# Patient Record
Sex: FEMALE | Race: BLACK OR AFRICAN AMERICAN | NOT HISPANIC OR LATINO | Employment: STUDENT | URBAN - METROPOLITAN AREA
[De-identification: names, ages, dates, MRNs, and addresses within clinical notes are randomized per-mention and may not be internally consistent; named-entity substitution may affect disease eponyms.]

---
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
## 2024-04-04 ENCOUNTER — CLINICAL SUPPORT (OUTPATIENT)
Dept: OBSTETRICS AND GYNECOLOGY | Facility: CLINIC | Age: 19
End: 2024-04-04
Payer: COMMERCIAL

## 2024-04-04 VITALS
HEIGHT: 66 IN | DIASTOLIC BLOOD PRESSURE: 65 MMHG | WEIGHT: 155.4 LBS | SYSTOLIC BLOOD PRESSURE: 111 MMHG | BODY MASS INDEX: 24.98 KG/M2

## 2024-04-04 DIAGNOSIS — Z30.42 ENCOUNTER FOR MANAGEMENT AND INJECTION OF DEPO-PROVERA: Primary | ICD-10-CM

## 2024-04-04 RX ORDER — MEDROXYPROGESTERONE ACETATE 150 MG/ML
150 INJECTION, SUSPENSION INTRAMUSCULAR ONCE
Status: COMPLETED | OUTPATIENT
Start: 2024-04-04 | End: 2024-04-04

## 2024-04-04 RX ADMIN — MEDROXYPROGESTERONE ACETATE 150 MG: 150 INJECTION, SUSPENSION INTRAMUSCULAR at 09:15

## 2024-04-04 NOTE — PROGRESS NOTES
Pt is here today for depo provera 150mg IM right deltoid, pt supplied , tolerated well , no reaction       NDC 84884-627-80  LOT [id number]  EXP 03/2026

## 2024-06-27 ENCOUNTER — TELEPHONE (OUTPATIENT)
Dept: OBSTETRICS AND GYNECOLOGY | Facility: CLINIC | Age: 19
End: 2024-06-27
Payer: COMMERCIAL

## 2024-06-27 DIAGNOSIS — Z30.42 DEPO-PROVERA CONTRACEPTIVE STATUS: Primary | ICD-10-CM

## 2024-06-27 RX ORDER — MEDROXYPROGESTERONE ACETATE 150 MG/ML
150 INJECTION, SUSPENSION INTRAMUSCULAR
Qty: 1 ML | Refills: 2 | Status: SHIPPED | OUTPATIENT
Start: 2024-06-27

## 2024-06-27 NOTE — TELEPHONE ENCOUNTER
Caller: Khadijah House    Relationship: Self    Best call back number: [phone] IF NEEDED CALL ANYTIME. IT IS OKAY TO LVM.    Requested Prescriptions:   Requested Prescriptions      No prescriptions requested or ordered in this encounter      DEPO     Pharmacy where request should be sent: [facility] [id number] [place], [place] - [address] AT [facility]) - [phone] [facility] [phone]      Last office visit with prescribing clinician: 11/2/2023   Last telemedicine visit with prescribing clinician: Visit date not found   Next office visit with prescribing clinician: Visit date not found     Additional details provided by patient: PATIENT IS SCHEDULED FOR DEPO INJECTION 06/28/24 AT 9:30 AM. PHARMACY NEEDS NEW PRESCRIPTION SENT MY END OF DAY TO BE ABLE TO FILL BY TOMORROW MORNING. UNABLE TO WARM TRANSFER.    Does the patient have less than a 3 day supply:  [x] Yes  [] No    Would you like a call back once the refill request has been completed: [] Yes [x] No    If the office needs to give you a call back, can they leave a voicemail: [x] Yes [] No    Mahad Lo   06/27/24 10:09 EDT

## 2024-06-27 NOTE — TELEPHONE ENCOUNTER
Spoke with Khadijah to let her know that Dr Fraser sent in Rx for Depo to [facility] so she can get her shot tomorrow. Thank you

## 2024-06-28 ENCOUNTER — CLINICAL SUPPORT (OUTPATIENT)
Dept: OBSTETRICS AND GYNECOLOGY | Facility: CLINIC | Age: 19
End: 2024-06-28
Payer: COMMERCIAL

## 2024-06-28 VITALS — WEIGHT: 141 LBS | BODY MASS INDEX: 22.77 KG/M2

## 2024-06-28 DIAGNOSIS — Z30.42 ENCOUNTER FOR MANAGEMENT AND INJECTION OF DEPO-PROVERA: Primary | ICD-10-CM

## 2024-06-28 PROCEDURE — 96372 THER/PROPH/DIAG INJ SC/IM: CPT | Performed by: STUDENT IN AN ORGANIZED HEALTH CARE EDUCATION/TRAINING PROGRAM

## 2024-06-28 RX ORDER — MEDROXYPROGESTERONE ACETATE 150 MG/ML
150 INJECTION, SUSPENSION INTRAMUSCULAR ONCE
Status: COMPLETED | OUTPATIENT
Start: 2024-06-28 | End: 2024-06-28

## 2024-06-28 RX ADMIN — MEDROXYPROGESTERONE ACETATE 150 MG: 150 INJECTION, SUSPENSION INTRAMUSCULAR at 09:05

## 2024-06-28 NOTE — PROGRESS NOTES
Pt here for pt supplied depo injection, tolerated without a reaction. Rt Deltoid  NDC:90099-314-35  LOT:[id number]  EXP:09/2025

## 2024-09-25 ENCOUNTER — CLINICAL SUPPORT (OUTPATIENT)
Dept: OBSTETRICS AND GYNECOLOGY | Facility: CLINIC | Age: 19
End: 2024-09-25
Payer: COMMERCIAL

## 2024-09-25 VITALS — BODY MASS INDEX: 23.9 KG/M2 | WEIGHT: 148 LBS

## 2024-09-25 DIAGNOSIS — Z30.42 ENCOUNTER FOR MANAGEMENT AND INJECTION OF DEPO-PROVERA: Primary | ICD-10-CM

## 2024-09-25 PROCEDURE — 96372 THER/PROPH/DIAG INJ SC/IM: CPT | Performed by: STUDENT IN AN ORGANIZED HEALTH CARE EDUCATION/TRAINING PROGRAM

## 2024-09-25 RX ORDER — MEDROXYPROGESTERONE ACETATE 150 MG/ML
150 INJECTION, SUSPENSION INTRAMUSCULAR ONCE
Status: COMPLETED | OUTPATIENT
Start: 2024-09-25 | End: 2024-09-25

## 2024-09-25 RX ADMIN — MEDROXYPROGESTERONE ACETATE 150 MG: 150 INJECTION, SUSPENSION INTRAMUSCULAR at 09:10

## 2024-10-24 ENCOUNTER — OFFICE VISIT (OUTPATIENT)
Dept: OBSTETRICS AND GYNECOLOGY | Facility: CLINIC | Age: 19
End: 2024-10-24
Payer: COMMERCIAL

## 2024-10-24 VITALS
SYSTOLIC BLOOD PRESSURE: 111 MMHG | DIASTOLIC BLOOD PRESSURE: 74 MMHG | WEIGHT: 149.6 LBS | BODY MASS INDEX: 24.04 KG/M2 | HEART RATE: 75 BPM | HEIGHT: 66 IN

## 2024-10-24 DIAGNOSIS — N94.2 VAGINISMUS: ICD-10-CM

## 2024-10-24 DIAGNOSIS — Z30.09 BIRTH CONTROL COUNSELING: Primary | ICD-10-CM

## 2024-10-24 NOTE — PROGRESS NOTES
"Chief Complaint   Patient presents with    Gynecologic Exam     Pt is here today to discuss other options for birthcontrol. Currently doing depo injections.        SUBJECTIVE:     Khadijah House is a 18 y.o.  who presents to discuss birth control options. The patient is currently receiving depo provera injections, last dose on 24. She reports doing well on the depo provera and not having any periods. She likes this about it but she forgets to come in and then has to reschedule the appointment for depo administration. Her periods prior to depo provera were very painful. She would like to know other options that are available to her for contraception.    She is also concerned regarding initial penetration being painful with intercourse and then hurting after intercourse. She states she is in a relationship with one male partner and it was not like this in the beginning. It now happens almost every time and she does not want to have intercourse.      History reviewed. No pertinent past medical history.   History reviewed. No pertinent surgical history.   Social History     Tobacco Use    Smoking status: Every Day   Vaping Use    Vaping status: Never Used   Substance Use Topics    Alcohol use: Never    Drug use: Yes     Types: Marijuana     OB History    Para Term  AB Living   0 0 0 0 0 0   SAB IAB Ectopic Molar Multiple Live Births   0 0 0 0 0 0        Review of Systems   Genitourinary:  Positive for dyspareunia.       OBJECTIVE:   Vitals:    10/24/24 0905   BP: 111/74   Pulse: 75   Weight: 67.9 kg (149 lb 9.6 oz)   Height: 167.6 cm (65.98\")        Physical Exam  Vitals reviewed.   Constitutional:       General: She is not in acute distress.  HENT:      Head: Normocephalic and atraumatic.      Right Ear: External ear normal.      Left Ear: External ear normal.   Eyes:      Extraocular Movements: Extraocular movements intact.      Pupils: Pupils are equal, round, and reactive to light. "   Pulmonary:      Effort: Pulmonary effort is normal. No respiratory distress.   Musculoskeletal:         General: No deformity. Normal range of motion.      Cervical back: Normal range of motion and neck supple.   Skin:     General: Skin is warm and dry.   Neurological:      General: No focal deficit present.      Mental Status: She is alert and oriented to person, place, and time.   Psychiatric:         Mood and Affect: Mood normal.         Behavior: Behavior normal.         ASSESSMENT:     ICD-10-CM ICD-9-CM   1. Birth control counseling  Z30.09 V25.09   2. Vaginismus  N94.2 625.1       PLAN:   Patient was counseled on contraceptive options, including oral contraceptive pills, vaginal rings, patches, Depo Provera, long acting reversible contraceptive options (Nexplanon, Mirena, Paragard, Kyleena), barrier methods (such as condoms).  Patient is interested in Nexplanon.  She was counseled on this methods efficacy, how to initiate this method, use of back up contraception. She was counseled on the risk of transmission of STDs and expected changes in the menstrual cycle including amenorrhea, regular periods, irregular bleeding patterns. Also there is possibility of weight changes, mood changes, skin changes. She was counseled on the risks involved with this medication including but not limited to bleeding, infection, damage to surrounding structures, migraines of device that would require additional surgery, increased risk of VTE disease.  To start the contraception we discussed it is necessary that we are reasonably certain that she is not pregnant. We will plan to insert before her next depo provera injection on 12/18/24. She agreed to abide by these conditions.    She was recommended to follow up soon if there are any side effects or problems.    We briefly discussed her symptoms of vaginismus that have developed since her relationship with her boyfriend. I recommended starting with pelvic floor PT and then if not  improving, consideration for vaginal valium. All questions answered.  Follow up for Nexplanon insertion once device ordered through her insurance.   Jennifer Fraser MD

## 2024-10-25 ENCOUNTER — TELEPHONE (OUTPATIENT)
Dept: OBSTETRICS AND GYNECOLOGY | Facility: CLINIC | Age: 19
End: 2024-10-25
Payer: COMMERCIAL

## 2024-10-25 NOTE — TELEPHONE ENCOUNTER
No answer/busy left vm to call back    Pt needs to unload or bring in insurance card to order nexplanon

## 2024-11-01 ENCOUNTER — TELEPHONE (OUTPATIENT)
Dept: OBSTETRICS AND GYNECOLOGY | Facility: CLINIC | Age: 19
End: 2024-11-01
Payer: COMMERCIAL

## 2024-11-06 ENCOUNTER — HOSPITAL ENCOUNTER (OUTPATIENT)
Dept: PHYSICAL THERAPY | Facility: HOSPITAL | Age: 19
Setting detail: THERAPIES SERIES
Discharge: HOME | End: 2024-11-06
Payer: COMMERCIAL

## 2024-11-06 DIAGNOSIS — M62.89 PELVIC FLOOR DYSFUNCTION: ICD-10-CM

## 2024-11-06 DIAGNOSIS — M62.838 MUSCLE SPASM: ICD-10-CM

## 2024-11-06 DIAGNOSIS — R10.2 PELVIC PAIN: Primary | ICD-10-CM

## 2024-11-06 PROCEDURE — 97161 PT EVAL LOW COMPLEX 20 MIN: CPT

## 2024-11-06 NOTE — THERAPY EVALUATION
Outpatient Physical Therapy Ortho Initial Evaluation  [facility]     Patient Name: Khadijah House  : 2005  MRN: [id number]  Today's Date: 2024      Visit Date: 2024    There is no problem list on file for this patient.       No past medical history on file.     No past surgical history on file.    Visit Dx:     ICD-10-CM ICD-9-CM   1. Pelvic pain  R10.2 [id number]   2. Pelvic floor dysfunction  M62.89 618.83   3. Muscle spasm  M62.838 728.85          Patient History       Row Name 24 1000             Fall Risk Assessment    Any falls in the past year: Yes  -RS         Services    Are you currently receiving Home Health services No  -RS         Daily Activities    Primary Language English  -RS      Are you able to read Yes  -RS      Are you able to write Yes  -RS      How does patient learn best? Listening;Reading;Demonstration  -RS      Pt Participated in POC and Goals Yes  -RS         Safety    Are you being hurt, hit, or frightened by anyone at home or in your life? No  -RS      Are you being neglected by a caregiver No  -RS                User Key  (r) = Recorded By, (t) = Taken By, (c) = Cosigned By      Initials Name Provider Type    RS Leanne Nice PT Physical Therapist                                 Pelvic Health       Row Name 24 1000             Subjective    Patient Reason for Visit Pelvic Pain  -RS      Brief Description of Chief Complaint The pt is a 17 yo female who presents with pelvic pain present about 10 months, when she first began having intercourse with her partner, she did not have pain but since  of this year she has been having pain. 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 She denies UUI/MARTÍNEZ or constipation.  -RS      Patient Participated in POC and Goals Yes  -RS         Fall Risk Assessment    Any falls in the past year: Yes  -RS         Services    Are you currently receiving Home Health services No  -RS          Daily Activities    Primary Language English  -RS      Are you able to read Yes  -RS      Are you able to write Yes  -RS      How does patient learn best? Listening;Reading;Demonstration  -RS      Pt Participated in POC and Goals Yes  -RS         Safety    Are you being hurt, hit, or frightened by anyone at home or in your life? No  -RS      Are you being neglected by a caregiver No  -RS         Urinary/Bowel History    Stress incontinence no  -RS      Post void dribble no  -RS      Urgency no  -RS         Pregnancy/Sexual History    Number of Pregnancies 0  -RS      Pain with initial penetration Yes  -RS      Pain with deep penetration Yes  -RS         Pelvic Floor Muscle    Patient/Parent/Guardian Consented to Internal Pelvic Floor Exam Yes  -RS      Strength (Right) 3: Squeeze with/without lift  -RS      Strength (Left) 3: Squeeze with/without lift  -RS      Symmetry of Sustained Maximal Contraction Symmetrical  -RS      Endurance (Ability to Hold Maximal Contraction) 5 sec  -RS      1st Layer Tone/Internal Palpation burning reported at introitus  -RS      2nd Layer Tone/Internal Palpation inc tone L>R  -RS      3rd Layer Tone/Internal Palpation inc tone and pain throughout, specifically OI  -RS      Internal Pelvic Floor Comments difficulty returning to resting position, elevated at baseline  -RS         Observations    Perineal Observation Performed? Yes  -RS         Observation of Contraction in Perineum    Anal [place] Present  -RS      Perineal Body Lift Present  -RS         Pelvic Floor    Ability to Isolate Contraction of Pelvic Floor Yes  -RS         Cough    Abdominal Contraction Yes  -RS      Leak None  -RS      Contraction of Pelvic Floor No  -RS      Bulge No  -RS      No Response Yes  -RS         Outcome Measures    Outcome Measure Options Female NIH-CPSI  -RS         Female NIH-CPSI    Pain Total 9  -RS      Urinary Symptoms Total 0  -RS      Quality of Life Impact Total 4  -RS      Total Score 13  -RS                 User Key  (r) = Recorded By, (t) = Taken By, (c) = Cosigned By      Initials Name Provider Type    RS Leanne Nice PT Physical Therapist                    Therapy Education  Education Details: Role of  PF PT, POC, differential diagnosis, initial HEP, expectations, goals, anatomy, role of PF. Access Code [email or web address]  Given: HEP  Program: New  How Provided: Verbal, Demonstration  Provided to: Patient  Level of Understanding: Verbalized, Demonstrated      PT OP Goals       Row Name 11/06/24 1200          PT Short Term Goals    STG Date to Achieve 12/21/24  -RS     STG 1 The pt will demonstrate ability to lengthen the pelvic floor with diaphragmatic breathing to indicate improved motor control and coordination.  -RS     STG 1 Progress New  -RS     STG 2 The pt will report understanding of initiation of dilator training to facilitate improved tolerance for penetration.  -RS     STG 2 Progress New  -RS        Long Term Goals    LTG Date to Achieve 02/04/25  -RS     LTG 1 The pt will report no more than 2/10 pain with intercourse to facilitate improved iADL performance.  -RS     LTG 1 Progress New  -RS     LTG 2 The pt will tolerate pelvic exam with speculum without significant increase in pain to facilitate improved IND with health maintenance.  -RS     LTG 2 Progress New  -RS     LTG 3 The pt will demonstrate appropriate bending/lifting mechanics to facilitate improved work performance.  -RS     LTG 3 Progress New  -RS        Time Calculation    PT Goal Re-Cert Due Date 02/04/25  -RS               User Key  (r) = Recorded By, (t) = Taken By, (c) = Cosigned By      Initials Name Provider Type    RS Leanne Nice PT Physical Therapist                     PT Assessment/Plan       Row Name 11/06/24 1200          PT Assessment    Functional Limitations Limitation in home management;Limitations in community activities;Performance in self-care ADL;Performance in work activities  -RS     Impairments  Impaired muscle length;Impaired muscle power;Impaired muscle endurance;Range of motion;Posture;Peripheral nerve integrity;Pain;Muscle strength  -RS     Assessment Comments Khadijah House is a 18 y.o. female referred to physical therapy for vaginismus. She presents with a stable clinical presentation, along with no remarkable comorbidities and personal factors of chronicity of pain that may impact her progress in the plan of care. Pt presents today with increased tone in pelvic floor muscles, layers 1-3 with burning at introitus and TTP B OI, difficulty coordinating PFM with breathing/lengthening . her signs and symptoms are consistent with referring diagnosis. The previous impairments limit her ability to tolerate pelvic exam or intercourse without pain. The pt self scores 13 on the femal NIH-CPSI. Pt will benefit from skilled PT to address the previous impairments and return to PLOF.  -RS     Please refer to paper survey for additional self-reported information No  -RS     Rehab Potential Good  -RS     Patient/caregiver participated in establishment of treatment plan and goals Yes  -RS     Patient would benefit from skilled therapy intervention Yes  -RS        PT Plan    PT Frequency 1x/week  -RS     Predicted Duration of Therapy Intervention (PT) 8-10 sessions  -RS     Planned CPT's? PT RE-EVAL: 62761;PT THER PROC EA 15 MIN: 60976;PT THER ACT EA 15 MIN: 73836;PT MANUAL THERAPY EA 15 MIN: 24000;PT NEUROMUSC RE-EDUCATION EA 15 MIN: 30242;PT GAIT TRAINING EA 15 MIN: 83895;PT SELF CARE/HOME MGMT/TRAIN EA 15: 51243;PT HOT OR COLD PACK TREAT MCARE;PT ELECTRICAL STIM UNATTEND: ;PT TRACTION LUMBAR: 32139;PT EVAL LOW COMPLEXITY: 62797  -RS     PT Plan Comments hip strength assessment, hip mobility, amarjit breathing, internal manual PRN, dilator education review, perineal massage  -RS               User Key  (r) = Recorded By, (t) = Taken By, (c) = Cosigned By      Initials Name Provider Type    RS Leanne Nice, PT  Physical Therapist                       OP Exercises       Row Name 11/06/24 1200 11/06/24 1000          Subjective    Patient Reason for Visit -- Pelvic Pain  -RS     Brief Description of Chief Complaint -- The pt is a 19 yo female who presents with pelvic pain present about 10 months, when she first began having intercourse with her partner, she did not have pain but since Jan of this year she has been having pain. 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 She denies UUI/MARTÍNEZ or constipation.  -RS     Patient Participated in POC and Goals -- Yes  -RS        Exercise 1    Exercise Name 1 dilator intro  -RS --        Exercise 2    Exercise Name 2 POC discussion  -RS --               User Key  (r) = Recorded By, (t) = Taken By, (c) = Cosigned By      Initials Name Provider Type    RS Leanne Nice PT Physical Therapist                                            Time Calculation:     Start Time: 1002  Stop Time: 1045  Time Calculation (min): 43 min  Untimed Charges  PT Eval/Re-eval Minutes: 40  Total Minutes  Untimed Charges Total Minutes: 40   Total Minutes: 40     Therapy Charges for Today       Code Description Service Date Service Provider Modifiers Qty    [id number]  PT EVAL LOW COMPLEXITY 3 11/6/2024 Leanne Nice, PT GP 1                      Leanne Nice PT  11/6/2024

## 2024-11-22 ENCOUNTER — TELEPHONE (OUTPATIENT)
Dept: OBSTETRICS AND GYNECOLOGY | Facility: CLINIC | Age: 19
End: 2024-11-22
Payer: COMMERCIAL

## 2024-11-22 NOTE — TELEPHONE ENCOUNTER
I asked her to bring her insurance to [facility] for the reason being , I have to order her Nexplanon with the Nexplanon company and they require a copy. since [place] pharmacy did not approve it. I will try to contact [facility] for buy and bill but its not guaranteed . Thanks !

## 2024-11-22 NOTE — TELEPHONE ENCOUNTER
Spoke with pt regarding [employer] insurance denying nexlplanon. Advised pt to upload or bring new insurance.   Pt states she is going to our [place] office sometime next week.

## 2024-11-22 NOTE — TELEPHONE ENCOUNTER
Pt told the hub she has [employer] insurance now, if you could please use that for the nexlplanon.       Thank you

## 2024-12-05 ENCOUNTER — HOSPITAL ENCOUNTER (OUTPATIENT)
Dept: PHYSICAL THERAPY | Facility: HOSPITAL | Age: 19
Setting detail: THERAPIES SERIES
Discharge: HOME | End: 2024-12-05
Payer: COMMERCIAL

## 2024-12-05 DIAGNOSIS — R10.2 PELVIC PAIN: Primary | ICD-10-CM

## 2024-12-05 DIAGNOSIS — M62.838 MUSCLE SPASM: ICD-10-CM

## 2024-12-05 DIAGNOSIS — M62.89 PELVIC FLOOR DYSFUNCTION: ICD-10-CM

## 2024-12-05 PROCEDURE — 97530 THERAPEUTIC ACTIVITIES: CPT

## 2024-12-05 PROCEDURE — 97110 THERAPEUTIC EXERCISES: CPT

## 2024-12-05 NOTE — THERAPY PROGRESS REPORT/RE-CERT
Outpatient Physical Therapy Ortho Progress Note  [facility]     Patient Name: Khadijah House  : 2005  MRN: [id number]  Today's Date: 2024      Visit Date: 2024    Visit Dx:    ICD-10-CM ICD-9-CM   1. Pelvic pain  R10.2 [id number]   2. Pelvic floor dysfunction  M62.89 618.83   3. Muscle spasm  M62.838 728.85       There is no problem list on file for this patient.       No past medical history on file.     No past surgical history on file.     PT Ortho       Row Name 24       General ROM    GENERAL ROM COMMENTS B hip ER AROM WNL, B hip IR 50% WNL  -RS       MMT (Manual Muscle Testing)    Rt Lower Ext Rt Hip Extension;Rt Hip Internal (Medial) Rotation;Rt Hip External (Lateral) Rotation;Rt Hip ABduction  -RS    Lt Lower Ext Lt Hip Extension;Lt Hip ABduction;Lt Hip External (Lateral) Rotation;Lt Hip Internal (Medial) Rotation  -RS       MMT Right Lower Ext    Rt Hip Extension MMT, Gross Movement (4/5) good  -RS    Rt Hip ABduction MMT, Gross Movement (4+/5) good plus  -RS    Rt Hip Internal (Medial) Rotation MMT, Gross Movement (4-/5) good minus  -RS    Rt Hip External (Lateral) Rotation MMT, Gross Movement (4+/5) good plus  -RS       MMT Left Lower Ext    Lt Hip Extension MMT, Gross Movement (4/5) good  -RS    Lt Hip ABduction MMT, Gross Movement (4+/5) good plus  -RS    Lt Hip Internal (Medial) Rotation MMT, Gross Movement (4-/5) good minus  -RS    Lt Hip External (Lateral) Rotation MMT, Gross Movement (4/5) good  -RS              User Key  (r) = Recorded By, (t) = Taken By, (c) = Cosigned By      Initials Name Provider Type    RS Leanne Nice PT Physical Therapist                                 PT Assessment/Plan       Row Name 24 09          PT Assessment    Functional Limitations Limitation in home management;Limitations in community activities;Performance in self-care ADL;Performance in work activities  -RS     Impairments Impaired muscle length;Impaired muscle  power;Impaired muscle endurance;Range of motion;Posture;Peripheral nerve integrity;Pain;Muscle strength  -RS     Assessment Comments Pt returns for first follow up session after initial eval reporting no significant changes. Assessed hip mobility and strength and continued diaphragmatic breathing and hip mobility in multiple positions. She has not met any short or long term goals as expected as this is her first follow up session. Encouraged pt to acquire dilators and discussed initiation, pt receptive.  -RS        PT Plan    PT Plan Comments perineal massage, hold for 2-3 weeks while initiating dilators, manual PRN  -RS               User Key  (r) = Recorded By, (t) = Taken By, (c) = Cosigned By      Initials Name Provider Type    RS Leanne Nice, PT Physical Therapist                       OP Exercises       Row Name 12/05/24 0800             Subjective    Subjective Comments pt reports no significant change since last session  -RS         Subjective Pain    Able to rate subjective pain? yes  -RS      Pre-Treatment Pain Level 0  -RS      Post-Treatment Pain Level 0  -RS         Total Minutes    55044 - PT Therapeutic Exercise Minutes 30  -RS      42116 - PT Therapeutic Activity Minutes 8  -RS         Exercise 1    Exercise Name 1 HL hip IR  -RS      Cueing 1 Verbal;Demo  -RS      Reps 1 1 min ea  -RS         Exercise 2    Exercise Name 2 cat cow hip IR  -RS      Cueing 2 Verbal;Demo  -RS      Reps 2 15  -RS         Exercise 3    Exercise Name 3 piriformis stretch  -RS      Cueing 3 Verbal;Demo  -RS      Reps 3 3  -RS      Time 3 30s  -RS         Exercise 4    Exercise Name 4 ruddy pose  -RS      Cueing 4 Verbal;Demo  -RS      Reps 4 3 min  -RS         Exercise 5    Exercise Name 5 discussion of dilator training, use of lubricant  -RS         Exercise 6    Exercise Name 6 diaphragmatic breathing  -RS      Reps 6 5 min  -RS      Additional Comments education  -RS                User Key  (r) = Recorded By, (t)  = Taken By, (c) = Cosigned By      Initials Name Provider Type    RS Leanne Nice, PT Physical Therapist                                  PT OP Goals       Row Name 12/05/24 0800          PT Short Term Goals    STG Date to Achieve 12/21/24  -RS     STG 1 The pt will demonstrate ability to lengthen the pelvic floor with diaphragmatic breathing to indicate improved motor control and coordination.  -RS     STG 1 Progress Ongoing  -RS     STG 2 The pt will report understanding of initiation of dilator training to facilitate improved tolerance for penetration.  -RS     STG 2 Progress Ongoing  -RS        Long Term Goals    LTG Date to Achieve 02/04/25  -RS     LTG 1 The pt will report no more than 2/10 pain with intercourse to facilitate improved iADL performance.  -RS     LTG 1 Progress Ongoing  -RS     LTG 1 Progress Comments pain 5/10 recent time, in sidelying  -RS     LTG 2 The pt will tolerate pelvic exam with speculum without significant increase in pain to facilitate improved IND with health maintenance.  -RS     LTG 2 Progress Ongoing  -RS     LTG 3 The pt will demonstrate appropriate bending/lifting mechanics to facilitate improved work performance.  -RS     LTG 3 Progress Ongoing  -RS               User Key  (r) = Recorded By, (t) = Taken By, (c) = Cosigned By      Initials Name Provider Type    RS Leanne Nice, PT Physical Therapist                    Therapy Education  Education Details: Access Code [email or web address]  Given: HEP  Program: Reinforced, Progressed  How Provided: Verbal, Demonstration, Written  Provided to: Patient  Level of Understanding: Verbalized, Demonstrated              Time Calculation:   Start Time: 0833  Stop Time: 0911  Time Calculation (min): 38 min  Timed Charges  96343 - PT Therapeutic Exercise Minutes: 30  61020 - PT Therapeutic Activity Minutes: 8  Total Minutes  Timed Charges Total Minutes: 38   Total Minutes: 38  Therapy Charges for Today       Code Description Service Date  Service Provider Modifiers Qty    97293231552  PT THER PROC EA 15 MIN 12/5/2024 Leanne Nice, PT GP 2    [id number]  PT THERAPEUTIC ACT EA 15 MIN 12/5/2024 Leanne Nice, PT GP 1                      Leanne Nice, PT  12/5/2024

## 2024-12-12 ENCOUNTER — HOSPITAL ENCOUNTER (OUTPATIENT)
Dept: PHYSICAL THERAPY | Facility: HOSPITAL | Age: 19
Setting detail: THERAPIES SERIES
Discharge: HOME | End: 2024-12-12
Payer: COMMERCIAL

## 2024-12-12 DIAGNOSIS — M62.838 MUSCLE SPASM: ICD-10-CM

## 2024-12-12 DIAGNOSIS — M62.89 PELVIC FLOOR DYSFUNCTION: ICD-10-CM

## 2024-12-12 DIAGNOSIS — R10.2 PELVIC PAIN: Primary | ICD-10-CM

## 2024-12-12 PROCEDURE — 97110 THERAPEUTIC EXERCISES: CPT

## 2024-12-12 PROCEDURE — 97530 THERAPEUTIC ACTIVITIES: CPT

## 2024-12-12 NOTE — THERAPY TREATMENT NOTE
Outpatient Physical Therapy Ortho Treatment Note  [facility]     Patient Name: Khadijah House  : 2005  MRN: [id number]  Today's Date: 2024      Visit Date: 2024    Visit Dx:    ICD-10-CM ICD-9-CM   1. Pelvic pain  R10.2 [id number]   2. Pelvic floor dysfunction  M62.89 618.83   3. Muscle spasm  M62.838 728.85       There is no problem list on file for this patient.       No past medical history on file.     No past surgical history on file.                     PT Assessment/Plan       Row Name 24 0900          PT Assessment    Assessment Comments Pt reports overall good tolerance for last session with slightly decreased pain during intercourse bbut continuend ain following. Pt has not bought dilators yet therefore encourged to look into purchasing, pt receptive. Initiated hip stability activities including clamshells, bridge, and squats as pt reports hip muscle pain following end of wrk day, pt reports fatigue in expected muscle groups.  updated HEP and reviewed with pt who reports understanding and remains appropriate for PT.  -RS        PT Plan    PT Plan Comments perineal massage, dilatos, hold for 1 month after next session  -RS               User Key  (r) = Recorded By, (t) = Taken By, (c) = Cosigned By      Initials Name Provider Type    RS Leanne Nice, PT Physical Therapist                       OP Exercises       Row Name 24 0800             Subjective    Subjective Comments pt had intercourse x2 and it was not too painful during but after she had paina nd burning for a short time  -RS         Subjective Pain    Able to rate subjective pain? yes  -RS      Pre-Treatment Pain Level 0  -RS      Post-Treatment Pain Level 0  -RS         Total Minutes    57194 - PT Therapeutic Exercise Minutes 30  -RS      53707 - PT Therapeutic Activity Minutes 8  -RS         Exercise 1    Exercise Name 1 clamshell  -RS      Cueing 1 Verbal;Demo  -RS      Sets 1 2  -RS      Reps 1 10  -RS       Time 1 GTB  -RS         Exercise 2    Exercise Name 2 thoracic rotation  -RS      Cueing 2 Verbal;Demo  -RS      Reps 2 10ea  -RS         Exercise 3    Exercise Name 3 bridge with AB  -RS      Cueing 3 Verbal;Demo  -RS      Sets 3 2  -RS      Reps 3 10  -RS      Time 3 3-5s  -RS         Exercise 4    Exercise Name 4 squat tapping hips  -RS      Cueing 4 Verbal;Demo  -RS      Sets 4 2  -RS      Reps 4 10  -RS      Time 4 GTB  -RS         Exercise 5    Exercise Name 5 review of POC, dilator training (education)  -RS         Exercise 6    Exercise Name 6 use of lubricant/positions (education)  -RS      Reps 6 --  -RS                User Key  (r) = Recorded By, (t) = Taken By, (c) = Cosigned By      Initials Name Provider Type    Leanne Pablo, PT Physical Therapist                                  PT OP Goals       Row Name 12/12/24 0800          PT Short Term Goals    STG Date to Achieve 12/21/24  -RS     STG 1 The pt will demonstrate ability to lengthen the pelvic floor with diaphragmatic breathing to indicate improved motor control and coordination.  -RS     STG 1 Progress Ongoing  -RS     STG 2 The pt will report understanding of initiation of dilator training to facilitate improved tolerance for penetration.  -RS     STG 2 Progress Ongoing  -RS        Long Term Goals    LTG Date to Achieve 02/04/25  -RS     LTG 1 The pt will report no more than 2/10 pain with intercourse to facilitate improved iADL performance.  -RS     LTG 1 Progress Ongoing  -RS     LTG 2 The pt will tolerate pelvic exam with speculum without significant increase in pain to facilitate improved IND with health maintenance.  -RS     LTG 2 Progress Ongoing  -RS     LTG 3 The pt will demonstrate appropriate bending/lifting mechanics to facilitate improved work performance.  -RS     LTG 3 Progress Ongoing  -RS               User Key  (r) = Recorded By, (t) = Taken By, (c) = Cosigned By      Initials Name Provider Type    GLENN Nice  EMMANUEL Perdomo Physical Therapist                    Therapy Education  Given: HEP  Program: Reinforced, Progressed  How Provided: Verbal, Demonstration, Written  Provided to: Patient  Level of Understanding: Verbalized, Demonstrated              Time Calculation:   Start Time: 0836  Stop Time: 0915  Time Calculation (min): 39 min  Timed Charges  51445 - PT Therapeutic Exercise Minutes: 30  23119 - PT Therapeutic Activity Minutes: 8  Total Minutes  Timed Charges Total Minutes: 38   Total Minutes: 38                Leanne Nice PT  12/12/2024

## 2024-12-17 ENCOUNTER — TELEPHONE (OUTPATIENT)
Dept: OBSTETRICS AND GYNECOLOGY | Facility: CLINIC | Age: 19
End: 2024-12-17
Payer: COMMERCIAL

## 2024-12-17 NOTE — TELEPHONE ENCOUNTER
Pt Nexplanon covered 100%   Spoke with Maury Esteban [facility] cross   Reference# [id number]

## 2024-12-19 ENCOUNTER — HOSPITAL ENCOUNTER (OUTPATIENT)
Dept: PHYSICAL THERAPY | Facility: HOSPITAL | Age: 19
Setting detail: THERAPIES SERIES
Discharge: HOME | End: 2024-12-19
Payer: COMMERCIAL

## 2024-12-19 DIAGNOSIS — M62.838 MUSCLE SPASM: ICD-10-CM

## 2024-12-19 DIAGNOSIS — M62.89 PELVIC FLOOR DYSFUNCTION: ICD-10-CM

## 2024-12-19 DIAGNOSIS — R10.2 PELVIC PAIN: Primary | ICD-10-CM

## 2024-12-19 PROCEDURE — 97530 THERAPEUTIC ACTIVITIES: CPT

## 2024-12-19 PROCEDURE — 97110 THERAPEUTIC EXERCISES: CPT

## 2024-12-19 NOTE — THERAPY TREATMENT NOTE
Outpatient Physical Therapy Ortho Treatment Note  [facility]     Patient Name: Khadijah House  : 2005  MRN: [id number]  Today's Date: 2024      Visit Date: 2024    Visit Dx:    ICD-10-CM ICD-9-CM   1. Pelvic pain  R10.2 [id number]   2. Pelvic floor dysfunction  M62.89 618.83   3. Muscle spasm  M62.838 728.85       There is no problem list on file for this patient.       No past medical history on file.     No past surgical history on file.                     PT Assessment/Plan       Row Name 24 0900          PT Assessment    Assessment Comments Pt reports good tolerance for last session and slightly decreased pain with penetration when in sidelying however continues to have pain at the entrance. Educaction was provided regarding perineal massage and dilator training as pt's dilators are arriving next week. pt reports understanding of education and updated HEP to include side steps.  -RS        PT Plan    PT Plan Comments Hold for 4-5 weeks, follow up regarding dilator training  -RS               User Key  (r) = Recorded By, (t) = Taken By, (c) = Cosigned By      Initials Name Provider Type    RS Leanne Nice, PT Physical Therapist                       OP Exercises       Row Name 24 0900             Subjective    Subjective Comments pt reports intercourse hurt more at the entrance, her dilators come in Monday  -RS         Subjective Pain    Able to rate subjective pain? yes  -RS      Pre-Treatment Pain Level 0  -RS      Post-Treatment Pain Level 0  -RS         Total Minutes    01495 - PT Therapeutic Exercise Minutes 25  -RS      04804 - PT Therapeutic Activity Minutes 14  -RS         Exercise 1    Exercise Name 1 clamshell  -RS      Cueing 1 Verbal;Demo  -RS      Sets 1 2  -RS      Reps 1 10  -RS      Time 1 GTB  -RS         Exercise 2    Exercise Name 2 thoracic rotation  -RS      Cueing 2 Verbal;Demo  -RS      Reps 2 10ea  -RS         Exercise 3    Exercise Name 3 bridge  with AB  -RS      Cueing 3 Verbal;Demo  -RS      Sets 3 2  -RS      Reps 3 10  -RS      Time 3 3-5s  -RS         Exercise 4    Exercise Name 4 squat tapping hips  -RS      Cueing 4 Verbal;Demo  -RS      Sets 4 2  -RS      Reps 4 10  -RS      Time 4 GTB  -RS         Exercise 5    Exercise Name 5 education- perineal massage  -RS         Exercise 6    Exercise Name 6 education- dilator initiation and progression  -RS         Exercise 7    Exercise Name 7 side steps  -RS      Cueing 7 Verbal  -RS      Reps 7 3 laps  -RS      Time 7 GTB  -RS                User Key  (r) = Recorded By, (t) = Taken By, (c) = Cosigned By      Initials Name Provider Type    RS Leanne Nice PT Physical Therapist                                  PT OP Goals       Row Name 12/19/24 0800          PT Short Term Goals    STG Date to Achieve 12/21/24  -RS     STG 1 The pt will demonstrate ability to lengthen the pelvic floor with diaphragmatic breathing to indicate improved motor control and coordination.  -RS     STG 1 Progress Met  -RS     STG 2 The pt will report understanding of initiation of dilator training to facilitate improved tolerance for penetration.  -RS     STG 2 Progress Met  -RS        Long Term Goals    LTG Date to Achieve 02/04/25  -RS     LTG 1 The pt will report no more than 2/10 pain with intercourse to facilitate improved iADL performance.  -RS     LTG 1 Progress Ongoing  -RS     LTG 2 The pt will tolerate pelvic exam with speculum without significant increase in pain to facilitate improved IND with health maintenance.  -RS     LTG 2 Progress Ongoing  -RS     LTG 3 The pt will demonstrate appropriate bending/lifting mechanics to facilitate improved work performance.  -RS     LTG 3 Progress Ongoing  -RS               User Key  (r) = Recorded By, (t) = Taken By, (c) = Cosigned By      Initials Name Provider Type    RS Leanne Nice PT Physical Therapist                    Therapy Education  Education Details: see ex  log  Given: HEP  Program: Reinforced, Progressed  How Provided: Verbal, Demonstration, Written  Provided to: Patient  Level of Understanding: Verbalized, Demonstrated              Time Calculation:   Start Time: 0839  Stop Time: 0920  Time Calculation (min): 41 min  Timed Charges  78944 - PT Therapeutic Exercise Minutes: 25  40725 - PT Therapeutic Activity Minutes: 14  Total Minutes  Timed Charges Total Minutes: 39   Total Minutes: 39  Therapy Charges for Today       Code Description Service Date Service Provider Modifiers Qty    [id number]  PT THER PROC EA 15 MIN 12/19/2024 Leanne Nice, PT GP 2    [id number]  PT THERAPEUTIC ACT EA 15 MIN 12/19/2024 Leanne Nice, PT GP 1                      Leanne Nice PT  12/19/2024

## 2024-12-27 ENCOUNTER — CLINICAL SUPPORT (OUTPATIENT)
Dept: OBSTETRICS AND GYNECOLOGY | Facility: CLINIC | Age: 19
End: 2024-12-27
Payer: COMMERCIAL

## 2024-12-27 VITALS
WEIGHT: 154 LBS | BODY MASS INDEX: 24.87 KG/M2 | HEART RATE: 81 BPM | DIASTOLIC BLOOD PRESSURE: 63 MMHG | SYSTOLIC BLOOD PRESSURE: 107 MMHG

## 2024-12-27 DIAGNOSIS — Z30.42 DEPO-PROVERA CONTRACEPTIVE STATUS: Primary | ICD-10-CM

## 2024-12-27 RX ORDER — MEDROXYPROGESTERONE ACETATE 150 MG/ML
150 INJECTION, SUSPENSION INTRAMUSCULAR ONCE
Status: COMPLETED | OUTPATIENT
Start: 2024-12-27 | End: 2024-12-27

## 2024-12-27 RX ORDER — MEDROXYPROGESTERONE ACETATE 150 MG/ML
150 INJECTION, SUSPENSION INTRAMUSCULAR
Qty: 1 EACH | Refills: 1 | Status: SHIPPED | OUTPATIENT
Start: 2024-12-27

## 2024-12-27 RX ADMIN — MEDROXYPROGESTERONE ACETATE 150 MG: 150 INJECTION, SUSPENSION INTRAMUSCULAR at 11:12

## 2025-01-23 ENCOUNTER — HOSPITAL ENCOUNTER (OUTPATIENT)
Dept: PHYSICAL THERAPY | Facility: HOSPITAL | Age: 20
Setting detail: THERAPIES SERIES
Discharge: HOME | End: 2025-01-23
Payer: COMMERCIAL

## 2025-01-23 DIAGNOSIS — M62.89 PELVIC FLOOR DYSFUNCTION: ICD-10-CM

## 2025-01-23 DIAGNOSIS — R10.2 PELVIC PAIN: Primary | ICD-10-CM

## 2025-01-23 DIAGNOSIS — M62.838 MUSCLE SPASM: ICD-10-CM

## 2025-01-23 PROCEDURE — 97110 THERAPEUTIC EXERCISES: CPT

## 2025-01-23 PROCEDURE — 97530 THERAPEUTIC ACTIVITIES: CPT

## 2025-01-23 NOTE — THERAPY PROGRESS REPORT/RE-CERT
Outpatient Physical Therapy Ortho Progress Note  [facility]     Patient Name: Khadijah House  : 2005  MRN: [id number]  Today's Date: 2025      Visit Date: 2025    Visit Dx:    ICD-10-CM ICD-9-CM   1. Pelvic pain  R10.2 [id number]   2. Pelvic floor dysfunction  M62.89 618.83   3. Muscle spasm  M62.838 728.85       There is no problem list on file for this patient.       No past medical history on file.     No past surgical history on file.                     PT Assessment/Plan       Row Name 25 0800          PT Assessment    Functional Limitations Limitation in home management;Limitations in community activities;Performance in self-care ADL;Performance in work activities  -RS     Impairments Impaired muscle length;Impaired muscle power;Impaired muscle endurance;Range of motion;Posture;Peripheral nerve integrity;Pain;Muscle strength  -RS     Assessment Comments The pt has been seen by PT for 5 total sessions focused on pelvic floor dysfunction. She reports good progress toward functional goals with 70% return to PLOF. She has met 2/2 STG and 0/3 LTG however is progressing well toward umet goals. She has recently begun using dilators at home with success, she is half-way through the sizes and reports good tolerance and understanding of progression. she has not attmepted intercourse since last session but repots previously she experienced pain rated 5/10. Progressed functional strength challenges as pt demonstrates decreased lumbopelvic stability, pt with good tolerance overall.  -RS        PT Plan    PT Plan Comments Follow up in approx 2 months to assess tolerance for pelvic exam at OBGYN, diltor training, and pain with intercourse  -RS               User Key  (r) = Recorded By, (t) = Taken By, (c) = Cosigned By      Initials Name Provider Type    RS Leanne Nice, PT Physical Therapist                       OP Exercises       Row Name 25 0800             Subjective    Subjective  Comments pt reports she has been doing well with the dilators  -RS         Subjective Pain    Able to rate subjective pain? yes  -RS      Pre-Treatment Pain Level 0  -RS      Post-Treatment Pain Level 0  -RS         Total Minutes    16599 - PT Therapeutic Exercise Minutes 30  -RS      35564 - PT Therapeutic Activity Minutes 9  -RS         Exercise 1    Exercise Name 1 clamshell  -RS      Cueing 1 Verbal;Demo  -RS      Sets 1 2  -RS      Reps 1 10  -RS      Time 1 GTB  -RS         Exercise 2    Exercise Name 2 bridge with march  -RS      Cueing 2 Verbal;Demo  -RS      Reps 2 15ea  -RS         Exercise 3    Exercise Name 3 primal push up hold  -RS      Cueing 3 Verbal;Demo  -RS      Sets 3 --  -RS      Reps 3 10  -RS      Time 3 5s  -RS         Exercise 4    Exercise Name 4 review- dilator training, progress, Plan of care, HEP update  -RS      Cueing 4 --  -RS      Sets 4 --  -RS      Reps 4 --  -RS      Time 4 --  -RS         Exercise 5    Exercise Name 5 AR press  -RS      Cueing 5 Verbal;Demo  -RS      Sets 5 15  -RS      Reps 5 RTB  -RS         Exercise 6    Exercise Name 6 monster walk  -RS      Cueing 6 Verbal;Demo  -RS      Reps 6 3laps  -RS      Time 6 GTB  -RS         Exercise 7    Exercise Name 7 side steps  -RS      Cueing 7 Verbal;Demo  -RS      Reps 7 3 laps  -RS      Time 7 GTB  -RS         Exercise 8    Exercise Name 8 squat with KB  -RS      Cueing 8 Verbal;Demo  -RS      Reps 8 20  -RS      Time 8 15#  -RS                User Key  (r) = Recorded By, (t) = Taken By, (c) = Cosigned By      Initials Name Provider Type    RS Leanne Nice, PT Physical Therapist                                  PT OP Goals       Row Name 01/23/25 0800          PT Short Term Goals    STG Date to Achieve 12/21/24  -RS     STG 1 The pt will demonstrate ability to lengthen the pelvic floor with diaphragmatic breathing to indicate improved motor control and coordination.  -RS     STG 1 Progress Met  -RS     STG 2 The pt  will report understanding of initiation of dilator training to facilitate improved tolerance for penetration.  -RS     STG 2 Progress Met  -RS        Long Term Goals    LTG Date to Achieve 02/04/25  -RS     LTG 1 The pt will report no more than 2/10 pain with intercourse to facilitate improved iADL performance.  -RS     LTG 1 Progress Ongoing;Progressing  -RS     LTG 1 Progress Comments 5/10, has not attempted recently  -RS     LTG 2 The pt will tolerate pelvic exam with speculum without significant increase in pain to facilitate improved IND with health maintenance.  -RS     LTG 2 Progress Ongoing;Progressing  -RS     LTG 3 The pt will demonstrate appropriate bending/lifting mechanics to facilitate improved work performance.  -RS     LTG 3 Progress Ongoing  -RS               User Key  (r) = Recorded By, (t) = Taken By, (c) = Cosigned By      Initials Name Provider Type    RS Leanne Nice PT Physical Therapist                    Therapy Education  Given: HEP  Program: Reinforced, Progressed  How Provided: Verbal, Demonstration, Written  Provided to: Patient  Level of Understanding: Verbalized, Demonstrated              Time Calculation:   Start Time: 0835  Stop Time: 0915  Time Calculation (min): 40 min  Timed Charges  12736 - PT Therapeutic Exercise Minutes: 30  22749 - PT Therapeutic Activity Minutes: 9  Total Minutes  Timed Charges Total Minutes: 39   Total Minutes: 39  Therapy Charges for Today       Code Description Service Date Service Provider Modifiers Qty    [id number]  PT THER PROC EA 15 MIN 1/23/2025 Leanne Nice, PT GP 2    [id number]  PT THERAPEUTIC ACT EA 15 MIN 1/23/2025 Leanne Nice, PT GP 1                      Leanne Nice PT  1/23/2025

## 2025-03-21 ENCOUNTER — CLINICAL SUPPORT (OUTPATIENT)
Dept: OBSTETRICS AND GYNECOLOGY | Facility: CLINIC | Age: 20
End: 2025-03-21
Payer: COMMERCIAL

## 2025-03-21 VITALS — BODY MASS INDEX: 24.71 KG/M2 | WEIGHT: 153 LBS

## 2025-03-21 DIAGNOSIS — Z30.42 ENCOUNTER FOR MANAGEMENT AND INJECTION OF DEPO-PROVERA: Primary | ICD-10-CM

## 2025-03-21 RX ORDER — MEDROXYPROGESTERONE ACETATE 150 MG/ML
150 INJECTION, SUSPENSION INTRAMUSCULAR ONCE
Status: COMPLETED | OUTPATIENT
Start: 2025-03-21 | End: 2025-03-21

## 2025-03-21 RX ADMIN — MEDROXYPROGESTERONE ACETATE 150 MG: 150 INJECTION, SUSPENSION INTRAMUSCULAR at 11:06

## 2025-06-09 ENCOUNTER — TELEPHONE (OUTPATIENT)
Dept: OBSTETRICS AND GYNECOLOGY | Facility: CLINIC | Age: 20
End: 2025-06-09
Payer: COMMERCIAL

## 2025-06-09 RX ORDER — MEDROXYPROGESTERONE ACETATE 150 MG/ML
150 INJECTION, SUSPENSION INTRAMUSCULAR
Qty: 1 EACH | Refills: 3 | Status: SHIPPED | OUTPATIENT
Start: 2025-06-09

## 2025-06-09 NOTE — TELEPHONE ENCOUNTER
Dr. Fraser,    Patient is needing refill sent in for depo.  Pharmacy updated    [facility] [id number] - [place], [place] - [address] [place] [address] AT [facility] - [phone] [facility] [phone] FX     Please advise  Thanks tonja

## 2025-06-13 ENCOUNTER — CLINICAL SUPPORT (OUTPATIENT)
Dept: OBSTETRICS AND GYNECOLOGY | Facility: CLINIC | Age: 20
End: 2025-06-13
Payer: COMMERCIAL

## 2025-06-13 VITALS — WEIGHT: 143 LBS | HEIGHT: 66 IN | BODY MASS INDEX: 22.98 KG/M2

## 2025-06-13 DIAGNOSIS — Z30.42 ENCOUNTER FOR MANAGEMENT AND INJECTION OF DEPO-PROVERA: Primary | ICD-10-CM

## 2025-06-13 RX ORDER — MEDROXYPROGESTERONE ACETATE 150 MG/ML
150 INJECTION, SUSPENSION INTRAMUSCULAR ONCE
Status: COMPLETED | OUTPATIENT
Start: 2025-06-13 | End: 2025-06-13

## 2025-06-13 RX ADMIN — MEDROXYPROGESTERONE ACETATE 150 MG: 150 INJECTION, SUSPENSION INTRAMUSCULAR at 09:04

## 2025-06-13 NOTE — PROGRESS NOTES
Pt here today for pt supplied depo injection, tolerated without a reaction. Rt DANIELLA  NDC:[address]-0  LOT:[id number]  EXP:09/2026

## 2025-06-26 ENCOUNTER — TELEPHONE (OUTPATIENT)
Dept: OBSTETRICS AND GYNECOLOGY | Facility: CLINIC | Age: 20
End: 2025-06-26
Payer: COMMERCIAL

## 2025-06-26 NOTE — TELEPHONE ENCOUNTER
Caller: Khadijah House    Relationship:  Self    Best call back number: [phone]      PATIENT CALLED REQUESTING TO CANCEL SAME DAY APPT.    Did the patient call AFTER the start time of their scheduled appointment?  []YES  [x]NO    Was the patient's appointment rescheduled? [x]YES  []NO